# Patient Record
Sex: MALE | Race: WHITE | NOT HISPANIC OR LATINO | URBAN - METROPOLITAN AREA
[De-identification: names, ages, dates, MRNs, and addresses within clinical notes are randomized per-mention and may not be internally consistent; named-entity substitution may affect disease eponyms.]

---

## 2021-07-12 ENCOUNTER — EMERGENCY (EMERGENCY)
Facility: HOSPITAL | Age: 36
LOS: 1 days | Discharge: HOME | End: 2021-07-12
Attending: EMERGENCY MEDICINE
Payer: COMMERCIAL

## 2021-07-12 VITALS
TEMPERATURE: 97 F | HEIGHT: 71 IN | SYSTOLIC BLOOD PRESSURE: 147 MMHG | HEART RATE: 52 BPM | RESPIRATION RATE: 18 BRPM | WEIGHT: 244.93 LBS | OXYGEN SATURATION: 100 % | DIASTOLIC BLOOD PRESSURE: 97 MMHG

## 2021-07-12 PROCEDURE — 73564 X-RAY EXAM KNEE 4 OR MORE: CPT

## 2021-07-12 PROCEDURE — 73564 X-RAY EXAM KNEE 4 OR MORE: CPT | Mod: 26,RT

## 2021-07-12 PROCEDURE — 99284 EMERGENCY DEPT VISIT MOD MDM: CPT | Mod: 25

## 2021-07-12 PROCEDURE — 99284 EMERGENCY DEPT VISIT MOD MDM: CPT

## 2021-07-12 RX ORDER — LIDOCAINE 4 G/100G
1 CREAM TOPICAL ONCE
Refills: 0 | Status: COMPLETED | OUTPATIENT
Start: 2021-07-12 | End: 2021-07-12

## 2021-07-12 RX ORDER — METHOCARBAMOL 500 MG/1
2 TABLET, FILM COATED ORAL
Qty: 24 | Refills: 0
Start: 2021-07-12 | End: 2021-07-15

## 2021-07-12 RX ORDER — METHOCARBAMOL 500 MG/1
1500 TABLET, FILM COATED ORAL ONCE
Refills: 0 | Status: COMPLETED | OUTPATIENT
Start: 2021-07-12 | End: 2021-07-12

## 2021-07-12 RX ORDER — LIDOCAINE 4 G/100G
1 CREAM TOPICAL
Qty: 7 | Refills: 0
Start: 2021-07-12 | End: 2021-07-18

## 2021-07-12 RX ORDER — IBUPROFEN 200 MG
600 TABLET ORAL ONCE
Refills: 0 | Status: COMPLETED | OUTPATIENT
Start: 2021-07-12 | End: 2021-07-12

## 2021-07-12 RX ORDER — IBUPROFEN 200 MG
1 TABLET ORAL
Qty: 28 | Refills: 0
Start: 2021-07-12 | End: 2021-07-18

## 2021-07-12 RX ADMIN — Medication 600 MILLIGRAM(S): at 10:32

## 2021-07-12 RX ADMIN — METHOCARBAMOL 1500 MILLIGRAM(S): 500 TABLET, FILM COATED ORAL at 10:32

## 2021-07-12 RX ADMIN — LIDOCAINE 1 PATCH: 4 CREAM TOPICAL at 10:31

## 2021-07-12 NOTE — ED PROVIDER NOTE - NSFOLLOWUPINSTRUCTIONS_ED_ALL_ED_FT
Follow up with spine center.  Follow up with Ortho.  Take medication as prescribed.  Come back with new or worsening symptoms.    Back Pain    Back pain is very common in adults. The cause of back pain is rarely dangerous and the pain often gets better over time. The cause of your back pain may not be known and may include strain of muscles or ligaments, degeneration of the spinal disks, or arthritis. Occasionally the pain may radiate down your leg(s). Over-the-counter medicines to reduce pain and inflammation are often the most helpful. Stretching and remaining active frequently helps the healing process.     SEEK IMMEDIATE MEDICAL CARE IF YOU HAVE ANY OF THE FOLLOWING SYMPTOMS: bowel or bladder control problems, unusual weakness or numbness in your arms or legs, nausea or vomiting, abdominal pain, fever, dizziness/lightheadedness.
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
## 2021-07-12 NOTE — ED PROVIDER NOTE - SECONDARY DIAGNOSIS.
Knee pain Back pain, unspecified back location, unspecified back pain laterality, unspecified chronicity
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
## 2021-07-12 NOTE — ED PROVIDER NOTE - CARE PLAN
Principal Discharge DX:	Back pain  Secondary Diagnosis:	Knee pain   Principal Discharge DX:	Knee pain, unspecified chronicity, unspecified laterality  Secondary Diagnosis:	Back pain, unspecified back location, unspecified back pain laterality, unspecified chronicity

## 2021-07-12 NOTE — ED PROVIDER NOTE - CLINICAL SUMMARY MEDICAL DECISION MAKING FREE TEXT BOX
Pt is a 36y M presenting s/p work injury for lower back pain radiating to R hip and R knee pain. Will xray and medicate.

## 2021-07-12 NOTE — ED PROVIDER NOTE - PATIENT PORTAL LINK FT
You can access the FollowMyHealth Patient Portal offered by [facility] by registering at the following website: [email or web address]. By joining [email or web address] FollowMyHealth portal, you will also be able to view your health information using other applications (apps) compatible with our system.

## 2021-07-12 NOTE — ED PROVIDER NOTE - CARE PROVIDER_API CALL
Addison Domingo)  Orthopaedic Surgery  [address] [place] [address]  Phone: [phone]  Fax: [phone]  Follow Up Time:

## 2021-07-12 NOTE — ED PROVIDER NOTE - NSFOLLOWUPCLINICS_GEN_ALL_ED_FT
[facility]  Ortho/Spine  [address] [place] [address]  Phone: [phone]  Fax:

## 2022-09-12 ENCOUNTER — APPOINTMENT (OUTPATIENT)
Dept: ORTHOPEDIC SURGERY | Facility: CLINIC | Age: 37
End: 2022-09-12

## 2022-09-12 VITALS — HEIGHT: 71 IN | WEIGHT: 245 LBS | BODY MASS INDEX: 34.3 KG/M2

## 2022-09-12 DIAGNOSIS — Z78.9 OTHER SPECIFIED HEALTH STATUS: ICD-10-CM

## 2022-09-12 DIAGNOSIS — J45.909 UNSPECIFIED ASTHMA, UNCOMPLICATED: ICD-10-CM

## 2022-09-12 PROCEDURE — 99072 ADDL SUPL MATRL&STAF TM PHE: CPT

## 2022-09-12 PROCEDURE — 99214 OFFICE O/P EST MOD 30 MIN: CPT

## 2022-09-12 NOTE — WORK
[Sprain/Strain] : sprain/strain [Was the competent medical cause of the injury] : was the competent medical cause of the injury [Are consistent with the injury] : are consistent with the injury [Consistent with my objective findings] : consistent with my objective findings [Partial] : partial [Does not reveal pre-existing condition(s) that may affect treatment/prognosis] : does not reveal pre-existing condition(s) that may affect treatment/prognosis [No Rx restrictions] : No Rx restrictions. [I provided the services listed above] :  I provided the services listed above.

## 2022-09-13 NOTE — IMAGING
[de-identified] : The patient is a well appearing 36 year old male of their stated age.\par Neck is supple & nontender to palpation. Negative Spurling's test.\par \par Right Shoulder: \par ROM:\par Forward Flexion: 180 degrees\par Abduction: 180 degrees\par ER at 90: 90 degrees\par IR at 90: 45 degrees\par ER at N: 50 degrees\par Motor:\par Abduction: 5 out of 5\par FPS: 5 out of 5\par Flexion: 5 out of 5\par Internal Rotation: 5 out of 5\par External Rotation: 5 out of 5\par Provocative Testing:\par Impingement: Negative\par Morrill's: Negative\par Other: N/A\par \par Left Shoulder:\par ROM:\par Forward Flexion: 180 degrees\par Abduction: 180 degrees\par ER at 90: 90 degrees\par IR at 90: 45 degrees\par ER at N: 50 degrees\par Motor:\par Abduction: 5 out of 5\par FPS: 5 out of 5\par Flexion: 5 out of 5\par Internal Rotation: 5 out of 5\par External Rotation: 5 out of 5\par Provocative Testing:\par Impingement: Negative\par Morrill's: Negative\par Other: N/A\par \par Effected Elbow: left \par ROM:\par Flexion: 0-140 degrees\par Supination: 90 degrees\par Pronation: 90 degrees \par Inspection:\par Erythema: None\par Ecchymosis: None\par Abrasions: None\par Effusion: None\par Deformity: None\par Palpation:\par Crepitus: None\par Medial Epicondyle/Flexor-Pronator: Nontender\par Lateral Epicondyle/ECRB: TENDER\par Olecranon: Nontender\par Radial Head: Nontender\par Distal Biceps: Nontender\par Distal Triceps: Nontender\par Flexor-Pronator: Nontender\par Extensor/ECRB: tender\par UCL: Nontender\par Pronator Intersection: Nontender\par Ulnar Nerve: Stable & Nontender\par Stress Testing:\par Varus at 0 Degrees: Stable\par Varus at 30 Degrees: Stable\par Valgus at 0 Degrees: Stable\par Valgus at 30 Degrees: Stable\par Motor:\par Elbow Flexion: 5 out of 5\par Elbow Extension: 5 out of 5\par Supination: 5 out of 5\par Pronation: 5 out of 5\par Wrist Flexion: 5 out of 5\par Wrist Extension: 5 out of 5\par Interossei: 5 out of 5\par : 5 out of 5\par Provocative Testing:\par Milking: Negative\par Moving Valgus Stress: Negative\par Posterolateral R-I: Negative\par Hook Test: Negative\par Resisted Wrist Extension: PAIN\par Resisted Index Finger Extension: PAIN\par Resisted Middle Finger Extension: PAIN\par Resisted Wrist Flexion: No Pain\par Resisted Pronation: No Pain\par Neurologic Exam:\par Axillary Nerve: SLT\par Radial Nerve: SLT\par Median Nerve: SLT\par Ulnar Nerve: SLT\par Other: N/A\par Vascular Exam:\par Radial Pulse: 2+\par Ulnar Pulse: 2+\par Capillary Refill: <2 Seconds\par Other Exams: None\par Pertinent Contralateral Elbow Findings: None\par \par \par Assessment: The patient is a 36 year old male with left elbow post traumatic lateral epicondylitis s/p work related injury (DOI 3/17/21).\par \par The patient’s condition is acute\par Documents/Results Reviewed Today: none\par Tests/Studies Independently Interpreted Today: none \par Pertinent findings include: mild lateral epicondyle tenderness, pain with resisted middle, index, wrist extension\par Confounding medical conditions/concerns: None \par \par Plan: Patient will use Voltaren gel, OTC NSAIDs and reparel elbow sleeve. Provided with PT script. \par Tests Ordered: None\par Prescription Medications Ordered: none \par Braces/DME Ordered: none \par Activity/Work/Sports Status: sergeant- continue working \par Additional Instructions: activity modifications. Discussed continuing pennsaid. \par Follow-Up: 6 weeks\par \par \par The patient's current medication management of their orthopedic diagnosis was reviewed today:\par (1) We discussed a comprehensive treatment plan that included possible pharmaceutical management involving the use of prescription strength medications including but not limited to options such as oral Naprosyn 500mg BID, once daily Meloxicam 15 mg, or 500-650 mg Tylenol versus over the counter oral medications and topical prescription NSAID Pennsaid vs over the counter Voltaren gel.\par (2) There is a moderate risk of morbidity with further treatment, especially from use of prescription strength medications and possible side effects of these medications which include upset stomach with oral medications, skin reactions to topical medications and cardiac/renal issues with long term use.\par (3) I recommended that the patient follow-up with their medical physician to discuss any significant specific potential issues with long term medication use such as interactions with current medications or with exacerbation of underlying medical comorbidities.\par (4) The benefits and risks associated with use of injectable, oral or topical, prescription and over the counter anti-inflammatory medications were discussed with the patient. The patient voiced understanding of the risks including but not limited to bleeding, stroke, kidney dysfunction, heart disease, and were referred to the black box warning label for further information.\par \par \par I, Jorge Warren, attest that this documentation has been prepared under the direction and in the presence of Provider Dr. Flores\par \par The documentation recorded by the scribe accurately reflects the service I personally performed and the decisions made by me.\par

## 2022-09-13 NOTE — HISTORY OF PRESENT ILLNESS
[de-identified] : The patient is a 37 year old right hand dominant male who presents today complaining of left elbow pain. \par Date of Injury/Onset:  3/17/21\par Pain: At Rest: 3/10 \par With Activity: 7/10 \par Mechanism of injury: patient was at work closing the car door his arm must've slipped causing him to experience pain\par This is a Work Related Injury being treated under Worker's Compensation.\par This is NOT an athletic injury occurring associated with an interscholastic or organized sports team.\par Quality of symptoms: dull-ache, sharp pain \par Improves with: ice, PT.\par Worse with: overuse, lifting. \par Treatment/Imaging/Studies Since Last Visit: PT\par Reports Available For Review Today: none\par Out of work/sport: currently working full duty\par School/Sport/Position/Occupation: sergeant  for [employer]\par Additional Information: None

## 2023-08-01 ENCOUNTER — APPOINTMENT (OUTPATIENT)
Dept: ORTHOPEDIC SURGERY | Facility: CLINIC | Age: 38
End: 2023-08-01

## 2023-08-03 ENCOUNTER — APPOINTMENT (OUTPATIENT)
Dept: ORTHOPEDIC SURGERY | Facility: CLINIC | Age: 38
End: 2023-08-03

## 2023-08-08 ENCOUNTER — APPOINTMENT (OUTPATIENT)
Dept: ORTHOPEDIC SURGERY | Facility: CLINIC | Age: 38
End: 2023-08-08
Payer: OTHER MISCELLANEOUS

## 2023-08-08 DIAGNOSIS — M77.12 LATERAL EPICONDYLITIS, LEFT ELBOW: ICD-10-CM

## 2023-08-08 DIAGNOSIS — M25.539 PAIN IN UNSPECIFIED WRIST: ICD-10-CM

## 2023-08-08 PROCEDURE — 99214 OFFICE O/P EST MOD 30 MIN: CPT

## 2023-08-08 PROCEDURE — 73110 X-RAY EXAM OF WRIST: CPT | Mod: LT

## 2023-08-25 PROBLEM — M25.539 WRIST PAIN: Status: ACTIVE | Noted: 2023-08-25

## 2023-08-25 PROBLEM — M77.12 LATERAL EPICONDYLITIS OF LEFT ELBOW: Status: ACTIVE | Noted: 2022-09-12
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
## 2023-08-25 NOTE — IMAGING
[de-identified] : Left elbow with +ttp at lateral epicondyle, worse with resisted wrist extension. Pro/sup to 70/70. Flexion/extension to 10 to 130.  Left wrist with mild swelling, +ttp at dorsal wrist. WE/EF to 50/50. Pro/sup to 75/75. Able to make fist, oppose thumb to small finger and abduct fingers. Sensation intact throughput.

## 2023-08-25 NOTE — ASSESSMENT
[FreeTextEntry1] : Left chronic lateral epicondylitis and left mild wrist sprain - reviewed radiographs and overall pathoanatomy with patient. Discussed management to consist of NSAIDAs prn, OT and bracing.  Based on above measurements and chronic symptoms of left elbow, scheduled loss of use 15%at left elbow. Based on above measurements of left wrist, scheduled loss of use 7.5%at left wrist.  F/u prn